# Patient Record
Sex: FEMALE | Race: WHITE | NOT HISPANIC OR LATINO | Employment: FULL TIME | URBAN - METROPOLITAN AREA
[De-identification: names, ages, dates, MRNs, and addresses within clinical notes are randomized per-mention and may not be internally consistent; named-entity substitution may affect disease eponyms.]

---

## 2019-09-19 ENCOUNTER — OFFICE VISIT (OUTPATIENT)
Dept: URGENT CARE | Facility: CLINIC | Age: 49
End: 2019-09-19
Payer: COMMERCIAL

## 2019-09-19 VITALS
RESPIRATION RATE: 18 BRPM | DIASTOLIC BLOOD PRESSURE: 72 MMHG | HEIGHT: 64 IN | HEART RATE: 105 BPM | WEIGHT: 232.8 LBS | TEMPERATURE: 99 F | OXYGEN SATURATION: 100 % | SYSTOLIC BLOOD PRESSURE: 126 MMHG | BODY MASS INDEX: 39.75 KG/M2

## 2019-09-19 DIAGNOSIS — H10.32 ACUTE CONJUNCTIVITIS OF LEFT EYE, UNSPECIFIED ACUTE CONJUNCTIVITIS TYPE: ICD-10-CM

## 2019-09-19 DIAGNOSIS — J06.9 ACUTE URI: Primary | ICD-10-CM

## 2019-09-19 PROCEDURE — 99203 OFFICE O/P NEW LOW 30 MIN: CPT | Performed by: FAMILY MEDICINE

## 2019-09-19 RX ORDER — BENZONATATE 200 MG/1
200 CAPSULE ORAL 3 TIMES DAILY PRN
Qty: 20 CAPSULE | Refills: 0 | Status: SHIPPED | OUTPATIENT
Start: 2019-09-19

## 2019-09-19 RX ORDER — POLYMYXIN B SULFATE AND TRIMETHOPRIM 1; 10000 MG/ML; [USP'U]/ML
1 SOLUTION OPHTHALMIC
Qty: 10 ML | Refills: 0 | Status: SHIPPED | OUTPATIENT
Start: 2019-09-19 | End: 2019-09-26

## 2019-09-19 RX ORDER — FLUTICASONE PROPIONATE 50 MCG
1 SPRAY, SUSPENSION (ML) NASAL DAILY
Qty: 1 BOTTLE | Refills: 0 | Status: SHIPPED | OUTPATIENT
Start: 2019-09-19

## 2019-09-19 NOTE — PROGRESS NOTES
[facility] [facility]        NAME: Jorge A Elizabeth is a 53 y o  female  : 1970    MRN: [id number]  DATE: 2019  TIME: 10:26 AM    Assessment and Plan   Acute URI [J06 9]  1  Acute URI  benzonatate (TESSALON) 200 MG capsule    fluticasone (FLONASE) 50 mcg/act nasal spray   2  Acute conjunctivitis of left eye, unspecified acute conjunctivitis type  polymyxin b-trimethoprim (POLYTRIM) ophthalmic solution         Patient Instructions     Patient Instructions   1  Acute viral URI (common cold)  - rest and drink plenty of fluids  - take Tylenol or Motrin as needed   - run a humidifier at home   - try warm salt water gargles and throat lozenges as needed   - take Tessalon pearls as needed for cough   - Flonase nasal spray to be used as directed   - if symptoms persist despite treatment or worsen, follow up w/ pcp for re-check     2  Acute conjunctivitis of left eye   - Polytrim eye drops prescribed, use as directed  - if symptoms persist despite treatment or worsen, follow up w/ pcp for re-check     Follow up with PCP in 5-7 days  Proceed to  ER if symptoms worsen  Chief Complaint     Chief Complaint   Patient presents with    Cold Like Symptoms     Pt here ill x 6 days pt states started with fever 100 6  now fever is gone, cough, head conestion  Pt states left eye is crusty today  Pt used Mucinex DM, Nyquil         History of Present Illness       54 yo female presents c/o nasal congestion, rhinorrhea, PND, sore throat, and productive cough x 5-6 days  She had a fever of 100 6 on the first day, no fever since  No chills, headache, or body aches  No chest pain, SOB, or wheezing  Non-smoker  No GI sx  No skin rashes  No recent travel or known sick contacts  She has been taking Mucinex DM, Robitussin cough syrup, and NyQuil as needed with mild temporary relief  She also states she woke up this morning and her left eye was red and crusting and had drainage  No eye pain or vision changes   No injury to the eye  No FB sensation  No tearing or photophobia  Does not wear contacts  No pain or difficulty w/ eye movement  She has not attempted any treatment for the eye symptoms  Review of Systems   Review of Systems   Constitutional: Negative  HENT:        As noted in HPI   Eyes:        As noted in HPI   Respiratory:        As noted in HPI   Cardiovascular: Negative  Gastrointestinal: Negative  Musculoskeletal: Negative  Skin: Negative  Neurological: Negative  Current Medications       Current Outpatient Medications:     benzonatate (TESSALON) 200 MG capsule, Take 1 capsule (200 mg total) by mouth 3 (three) times a day as needed for cough, Disp: 20 capsule, Rfl: 0    fluticasone (FLONASE) 50 mcg/act nasal spray, 1 spray into each nostril daily, Disp: 1 Bottle, Rfl: 0    polymyxin b-trimethoprim (POLYTRIM) ophthalmic solution, Administer 1 drop into the left eye every 3 (three) hours for 7 days, Disp: 10 mL, Rfl: 0    Current Allergies     Allergies as of 09/19/2019    (No Known Allergies)            The following portions of the patient's history were reviewed and updated as appropriate: allergies, current medications, past family history, past medical history, past social history, past surgical history and problem list      Past Medical History:   Diagnosis Date    Patient denies medical problems        Past Surgical History:   Procedure Laterality Date    CARPAL TUNNEL RELEASE      both    CHOLECYSTECTOMY      HERNIA REPAIR      KNEE ARTHROPLASTY         Family History   Problem Relation Age of Onset    Diabetes Mother     Hypertension Mother     Kidney disease Father     Dementia Father     Hypertension Father          Medications have been verified          Objective   /72 (BP Location: Right arm, Patient Position: Sitting, Cuff Size: Large)   Pulse 105   Temp 99 °F (37 2 °C) (Tympanic)   Resp 18   Ht 5' 4" (1 626 m)   Wt 106 kg (232 lb 12 8 oz)   SpO2 100% BMI 39 96 kg/m²        Physical Exam     Physical Exam   Constitutional: She is oriented to person, place, and time  Vital signs are normal  She appears well-developed and well-nourished  She is active and cooperative  Non-toxic appearance  She does not have a sickly appearance  She does not appear ill  No distress  HENT:   Head: Normocephalic and atraumatic  Right Ear: Tympanic membrane, external ear and ear canal normal    Left Ear: Tympanic membrane, external ear and ear canal normal    Nose: Mucosal edema and rhinorrhea present  Mouth/Throat: Uvula is midline, oropharynx is clear and moist and mucous membranes are normal    Eyes: Pupils are equal, round, and reactive to light  EOM and lids are normal  Left eye exhibits no hordeolum  No foreign body present in the left eye  Right conjunctiva is not injected  Right conjunctiva has no hemorrhage  Left conjunctiva is injected  Left conjunctiva has no hemorrhage  Neck: Normal range of motion  Neck supple  Pulmonary/Chest: Effort normal and breath sounds normal  No respiratory distress  Lymphadenopathy:     She has no cervical adenopathy  Neurological: She is alert and oriented to person, place, and time  Skin: She is not diaphoretic  Psychiatric: She has a normal mood and affect  Her behavior is normal  Judgment and thought content normal    Nursing note and vitals reviewed

## 2019-09-19 NOTE — PATIENT INSTRUCTIONS
1  Acute viral URI (common cold)  - rest and drink plenty of fluids  - take Tylenol or Motrin as needed   - run a humidifier at home   - try warm salt water gargles and throat lozenges as needed   - take Tessalon pearls as needed for cough   - Flonase nasal spray to be used as directed   - if symptoms persist despite treatment or worsen, follow up w/ pcp for re-check     2   Acute conjunctivitis of left eye   - Polytrim eye drops prescribed, use as directed  - if symptoms persist despite treatment or worsen, follow up w/ pcp for re-check

## 2019-10-13 DIAGNOSIS — J06.9 ACUTE URI: ICD-10-CM

## 2019-10-13 RX ORDER — FLUTICASONE PROPIONATE 50 MCG
SPRAY, SUSPENSION (ML) NASAL
Qty: 16 ML | Refills: 0 | OUTPATIENT
Start: 2019-10-13

## 2020-07-29 DIAGNOSIS — J06.9 ACUTE URI: ICD-10-CM

## 2020-07-29 RX ORDER — FLUTICASONE PROPIONATE 50 MCG
SPRAY, SUSPENSION (ML) NASAL
Qty: 16 ML | Refills: 0 | OUTPATIENT
Start: 2020-07-29